# Patient Record
Sex: MALE | Race: WHITE | NOT HISPANIC OR LATINO | URBAN - METROPOLITAN AREA
[De-identification: names, ages, dates, MRNs, and addresses within clinical notes are randomized per-mention and may not be internally consistent; named-entity substitution may affect disease eponyms.]

---

## 2021-12-27 ENCOUNTER — TELEPHONE (OUTPATIENT)
Dept: DERMATOLOGY | Facility: CLINIC | Age: 51
End: 2021-12-27

## 2021-12-28 ENCOUNTER — TELEPHONE (OUTPATIENT)
Dept: DERMATOLOGY | Facility: CLINIC | Age: 51
End: 2021-12-28
Payer: COMMERCIAL

## 2022-02-01 ENCOUNTER — TELEPHONE (OUTPATIENT)
Dept: DERMATOLOGY | Facility: CLINIC | Age: 52
End: 2022-02-01
Payer: COMMERCIAL

## 2022-02-01 NOTE — TELEPHONE ENCOUNTER
----- Message from Stephany Thompson sent at 2/1/2022  2:01 PM CST -----  Regarding: Procedure  Inquiry  Regarding:Pt called to speak to office about procedure he has set for tomorrow. Would like to know if can take a Xanax and does he need a covid test?    Can patient be contacted on [employer]:no    Call back number:[phone]

## 2022-02-01 NOTE — TELEPHONE ENCOUNTER
Spoke with patient and reassured him it is okay to take a Xanax before procedure tomorrow and we do not require a Covid test.

## 2022-02-02 ENCOUNTER — PROCEDURE VISIT (OUTPATIENT)
Dept: DERMATOLOGY | Facility: CLINIC | Age: 52
End: 2022-02-02
Payer: COMMERCIAL

## 2022-02-02 VITALS
DIASTOLIC BLOOD PRESSURE: 90 MMHG | SYSTOLIC BLOOD PRESSURE: 138 MMHG | HEIGHT: 76 IN | BODY MASS INDEX: 26.18 KG/M2 | WEIGHT: 215 LBS | HEART RATE: 59 BPM

## 2022-02-02 DIAGNOSIS — C44.311 BASAL CELL CARCINOMA OF NOSE: Primary | ICD-10-CM

## 2022-02-02 PROCEDURE — 14060 TIS TRNFR E/N/E/L 10 SQ CM/<: CPT | Mod: S$GLB,,, | Performed by: DERMATOLOGY

## 2022-02-02 PROCEDURE — 17311: ICD-10-PCS | Mod: 51,S$GLB,, | Performed by: DERMATOLOGY

## 2022-02-02 PROCEDURE — 17312: ICD-10-PCS | Mod: S$GLB,,, | Performed by: DERMATOLOGY

## 2022-02-02 PROCEDURE — 14060 PR ADJ TISS XFER LID,NOS,EAR <10 SQCM: ICD-10-PCS | Mod: S$GLB,,, | Performed by: DERMATOLOGY

## 2022-02-02 PROCEDURE — 99499 UNLISTED E&M SERVICE: CPT | Mod: S$GLB,,, | Performed by: DERMATOLOGY

## 2022-02-02 PROCEDURE — 17311 MOHS 1 STAGE H/N/HF/G: CPT | Mod: 51,S$GLB,, | Performed by: DERMATOLOGY

## 2022-02-02 PROCEDURE — 17312 MOHS ADDL STAGE: CPT | Mod: S$GLB,,, | Performed by: DERMATOLOGY

## 2022-02-02 PROCEDURE — 99499 NO LOS: ICD-10-PCS | Mod: S$GLB,,, | Performed by: DERMATOLOGY

## 2022-02-02 RX ORDER — CEPHALEXIN 500 MG/1
500 CAPSULE ORAL 3 TIMES DAILY
Qty: 21 CAPSULE | Refills: 0 | Status: SHIPPED | OUTPATIENT
Start: 2022-02-02 | End: 2022-02-09

## 2022-02-02 RX ORDER — HYDROCODONE BITARTRATE AND ACETAMINOPHEN 5; 325 MG/1; MG/1
1 TABLET ORAL EVERY 6 HOURS PRN
Qty: 10 TABLET | Refills: 0 | Status: SHIPPED | OUTPATIENT
Start: 2022-02-02

## 2022-02-02 RX ORDER — LEVOCETIRIZINE DIHYDROCHLORIDE 5 MG/1
5 TABLET, FILM COATED ORAL NIGHTLY
COMMUNITY
Start: 2021-12-13

## 2022-02-02 NOTE — PROGRESS NOTES
Physical Exam   HENT:   Nose:         R nasal tip with a 4 x 4 mm pink pearly papule located 4.5 cm inferiorly from the right medial canthus and 3.5 cm superiorly from the right alar base.    Biopsy proven nodular basal cell carcinoma- Nose, path# [id number].  Diagnosis, photograph, and pathology report were reviewed with patient.  Discussed risks, benefits, and alternatives of Mohs surgery.  Discussed repair options including complex closure, skin flap, skin graft, and second intention healing.  Patient elected to proceed with Mohs surgery today.      PROCEDURE: Mohs' Micrographic Surgery    INDICATION: Location in mask areas of face including central face, nose, eyelids, eyebrows, lips, chin, preauricular, temple, and ear. Biopsy-proven skin cancer of cosmetically and functionally important areas, including head, neck, genital, hand, foot, or areas known for having difficulty in healing, such as the lower anterior legs. Tumor with ill-defined borders.    REFERRING PROVIDER: Leyla Hernandez MD    CASE NUMBER:     ANESTHETIC: 1 cc 1% Lidocaine, plain and 1.5 cc 0.5% Lidocaine with Epi 1:200,000 mixed 1:1 with 0.5% Bupivacaine    SURGICAL PREP: Hibiclens    SURGEON: David Anderson MD    ASSISTANTS: Cortney Coronel PA-C and Oksana Fernandes, Surg Tech    PREOPERATIVE DIAGNOSIS: basal cell carcinoma- nodular    POSTOPERATIVE DIAGNOSIS: basal cell carcinoma    PATHOLOGIC DIAGNOSIS: basal cell carcinoma- nodular    HISTOLOGY OF SPECIMENS IN FIRST STAGE:   Tumor Type: Tumor seen. Nodular basal cell carcinoma: Nodular tumor in dermis composed of basaloid cells exhibiting peripheral palisading and retraction artifact.   Depth of Invasion: subcutaneous tissue  Perineural Invasion: No    HISTOLOGY OF SPECIMENS IN SUBSEQUENT STAGES:  · Tumor Type: No tumor seen.    STAGES OF MOHS' SURGERY PERFORMED: 2    TUMOR-FREE PLANE ACHIEVED: Yes - with deep tissue removal    HEMOSTASIS: electrocoagulation     SPECIMENS: 3 (2 in stage A and 1  in stage B)    LOCATION: nose (R nasal tip). Location verified with Dr. Hernandez's clinical photograph. Patient also verified location with hand held mirror.    INITIAL LESION SIZE: 0.4 x 0.4 cm    FINAL DEFECT SIZE: 0.5 x 0.6 cm    WOUND REPAIR/DISPOSITION: The patient tolerated Mohs' Micrographic Surgery for a basal cell carcinoma very well. When the tumor was completely removed, a repair of the surgical defect was undertaken.       PROCEDURE: Burow's Advancement Flap (Adjacent Tissue Transfer)    INDICATION: Status post Mohs' Micrographic Surgery for basal cell carcinoma.    CASE NUMBER:     ANESTHETIC: 1 cc 1% Lidocaine with Epinephrine 1:100,000    SURGICAL PREP: Hibiclens, prepped by Valerie Arce MA    SURGEON: David Anderson MD    ASSISTANTS: Cortney Coronel PA-C, Valerie Arce MA and Júnior Mahan MD    LOCATION: nose (R nasal tip)    DEFECT SIZE: 0.5 x 0.6 cm    WOUND REPAIR/DISPOSITION:  After the patient's carcinoma had been completely removed with Mohs' Micrographic Surgery, a repair of the surgical defect was undertaken. The patient was returned to the operating suite where the area of right nasal tip was prepped, draped, and anesthetized in the usual sterile fashion. A Burow's advancement flap was designed in the inferior surrounding tissue of the nasal tip. A Burow's triangle was drawn in superiorly to help with tissue movement. Then with a #15 blade the flap was incised and the Burow's triangle was excised, the area was widely undermined in the submuscular tissue plane. Then, electrocoagulation was used to obtain meticulous hemostasis. A 5-0 Vicryl pexing suture was performed by attaching the underside of the most medial aspect of the flap to the nasal tip. The flap was then advanced in by a complex closure. Then, 5-0 Vicryl buried vertical mattress sutures were placed into the subcutaneous and dermal plane to close the wound and zainab the cutaneous wound edge. The flap was then trimmed to fit the  "defect. The cutaneous wound edges were closed using interrupted 5-0 Prolene sutures.    The patient tolerated the procedure well.    The area was cleaned and dressed appropriately and the patient was given wound care instructions, as well as an appointment for follow-up evaluation and suture removal in 7 days. Patient was placed on Norco 5-325 mg prn postop pain and Keflex 500 mg TID x 7 days.    SIZE OF FLAP: 4 cm squared    Vitals:    02/02/22 0749 02/02/22 1126   BP: (!) 142/90 (!) 138/90   BP Location: Right arm    Patient Position: Sitting    BP Method: Small (Automatic)    Pulse: 71 (!) 59   Weight: 97.5 kg (215 lb)    Height: 6' 4" (1.93 m)        "

## 2022-02-09 ENCOUNTER — OFFICE VISIT (OUTPATIENT)
Dept: DERMATOLOGY | Facility: CLINIC | Age: 52
End: 2022-02-09
Payer: COMMERCIAL

## 2022-02-09 DIAGNOSIS — Z09 POSTOP CHECK: Primary | ICD-10-CM

## 2022-02-09 PROCEDURE — 99024 PR POST-OP FOLLOW-UP VISIT: ICD-10-PCS | Mod: S$GLB,,, | Performed by: DERMATOLOGY

## 2022-02-09 PROCEDURE — 99024 POSTOP FOLLOW-UP VISIT: CPT | Mod: S$GLB,,, | Performed by: DERMATOLOGY

## 2022-02-09 PROCEDURE — 99999 PR PBB SHADOW E&M-EST. PATIENT-LVL II: ICD-10-PCS | Mod: PBBFAC,,, | Performed by: DERMATOLOGY

## 2022-02-09 PROCEDURE — 1159F PR MEDICATION LIST DOCUMENTED IN MEDICAL RECORD: ICD-10-PCS | Mod: CPTII,S$GLB,, | Performed by: DERMATOLOGY

## 2022-02-09 PROCEDURE — 1160F RVW MEDS BY RX/DR IN RCRD: CPT | Mod: CPTII,S$GLB,, | Performed by: DERMATOLOGY

## 2022-02-09 PROCEDURE — 1159F MED LIST DOCD IN RCRD: CPT | Mod: CPTII,S$GLB,, | Performed by: DERMATOLOGY

## 2022-02-09 PROCEDURE — 99999 PR PBB SHADOW E&M-EST. PATIENT-LVL II: CPT | Mod: PBBFAC,,, | Performed by: DERMATOLOGY

## 2022-02-09 PROCEDURE — 1160F PR REVIEW ALL MEDS BY PRESCRIBER/CLIN PHARMACIST DOCUMENTED: ICD-10-PCS | Mod: CPTII,S$GLB,, | Performed by: DERMATOLOGY

## 2022-02-09 NOTE — PROGRESS NOTES
51 y.o. male patient is here for suture removal following Mohs' surgery.    Patient reports no problems.    WOUND PE:  The nose sutures intact. Wound healing well. Good skin edges. No signs or symptoms of infection. Flap is pink and viable.    IMPRESSION:  Healing operative site from Mohs' surgery, BCC nose s/p Mohs with Burow's Advancement Flap, postop day #7.    PLAN:  Sutures removed today by Valerie Arce MA. Steri-strips applied.  Continue wound care.  Keep moist with Aquaphor.    RTC:  In 1 month.

## 2022-02-14 ENCOUNTER — PATIENT MESSAGE (OUTPATIENT)
Dept: DERMATOLOGY | Facility: CLINIC | Age: 52
End: 2022-02-14
Payer: COMMERCIAL

## 2022-02-15 ENCOUNTER — TELEPHONE (OUTPATIENT)
Dept: DERMATOLOGY | Facility: CLINIC | Age: 52
End: 2022-02-15
Payer: COMMERCIAL

## 2022-02-15 ENCOUNTER — PATIENT MESSAGE (OUTPATIENT)
Dept: DERMATOLOGY | Facility: CLINIC | Age: 52
End: 2022-02-15
Payer: COMMERCIAL

## 2022-02-15 NOTE — TELEPHONE ENCOUNTER
Pt had his sutures removed on 2/9/22 on his nose s/p Mohs with Burow's Advancement Flap. Pt accidentally hit his nose while putting on a shirt. Sent a picture through the portal and it appears that it may have opened up. Pt is scheduled to come in tomorrow afternoon at 210 pm. Pt confirmed date and time.

## 2022-02-15 NOTE — TELEPHONE ENCOUNTER
----- Message from Crista Lindsay sent at 2/15/2022  3:32 PM CST -----  Type: Patient Call Back         Who called: Patient          What is the request in detail: patient called and states that if its  ok for him to come in on today  to get that wound check out . And he also wants to know what time does the department close on today ?         Can the clinic reply by [facility]?no          Would the patient rather a call back or a response via [facility]?call back          Best call back number:[phone] (mobile)          Additional Information:           Thank You

## 2022-02-16 ENCOUNTER — OFFICE VISIT (OUTPATIENT)
Dept: DERMATOLOGY | Facility: CLINIC | Age: 52
End: 2022-02-16
Payer: COMMERCIAL

## 2022-02-16 DIAGNOSIS — C44.311 BASAL CELL CARCINOMA OF NOSE: Primary | ICD-10-CM

## 2022-02-16 PROCEDURE — 1160F PR REVIEW ALL MEDS BY PRESCRIBER/CLIN PHARMACIST DOCUMENTED: ICD-10-PCS | Mod: CPTII,S$GLB,, | Performed by: DERMATOLOGY

## 2022-02-16 PROCEDURE — 87077 CULTURE AEROBIC IDENTIFY: CPT | Performed by: DERMATOLOGY

## 2022-02-16 PROCEDURE — 1159F PR MEDICATION LIST DOCUMENTED IN MEDICAL RECORD: ICD-10-PCS | Mod: CPTII,S$GLB,, | Performed by: DERMATOLOGY

## 2022-02-16 PROCEDURE — 99024 POSTOP FOLLOW-UP VISIT: CPT | Mod: S$GLB,,, | Performed by: DERMATOLOGY

## 2022-02-16 PROCEDURE — 1159F MED LIST DOCD IN RCRD: CPT | Mod: CPTII,S$GLB,, | Performed by: DERMATOLOGY

## 2022-02-16 PROCEDURE — 1160F RVW MEDS BY RX/DR IN RCRD: CPT | Mod: CPTII,S$GLB,, | Performed by: DERMATOLOGY

## 2022-02-16 PROCEDURE — 99999 PR PBB SHADOW E&M-EST. PATIENT-LVL I: CPT | Mod: PBBFAC,,, | Performed by: DERMATOLOGY

## 2022-02-16 PROCEDURE — 87070 CULTURE OTHR SPECIMN AEROBIC: CPT | Performed by: DERMATOLOGY

## 2022-02-16 PROCEDURE — 99999 PR PBB SHADOW E&M-EST. PATIENT-LVL I: ICD-10-PCS | Mod: PBBFAC,,, | Performed by: DERMATOLOGY

## 2022-02-16 PROCEDURE — 99024 PR POST-OP FOLLOW-UP VISIT: ICD-10-PCS | Mod: S$GLB,,, | Performed by: DERMATOLOGY

## 2022-02-16 PROCEDURE — 87186 SC STD MICRODIL/AGAR DIL: CPT | Performed by: DERMATOLOGY

## 2022-02-16 NOTE — PROGRESS NOTES
51 y.o. male patient is here for wound check after surgery.    Patient reports concern about wound healing. States he bumped his nose in middle of night, woke him up in pain right after it happened.     WOUND PE:  The nasal tip flap with central dehiscence and superficial crusting. No obvious signs of infection    IMPRESSION:  Healing operative site from Mohs' surgery BCC nose s/p Mohs with Burow's advancement flap, postop day # 14, now with mild central dehsicence    PLAN:  Bacterial culture today to rule out any underlying infection  Hold off on abx pending culture results  For now, keep moist and covered daily  No peroxide, soap and water only  Continue wound care.   Wound rebandaged today  Reassured pt it will heal but take a little longer now  Send in photos as needed    RTC:  In 1 month.

## 2022-02-20 LAB — BACTERIA SPEC AEROBE CULT: ABNORMAL

## 2022-03-08 ENCOUNTER — PATIENT MESSAGE (OUTPATIENT)
Dept: DERMATOLOGY | Facility: CLINIC | Age: 52
End: 2022-03-08
Payer: COMMERCIAL

## 2022-03-16 ENCOUNTER — OFFICE VISIT (OUTPATIENT)
Dept: DERMATOLOGY | Facility: CLINIC | Age: 52
End: 2022-03-16
Payer: COMMERCIAL

## 2022-03-16 DIAGNOSIS — Z09 POSTOP CHECK: Primary | ICD-10-CM

## 2022-03-16 PROCEDURE — 1159F MED LIST DOCD IN RCRD: CPT | Mod: CPTII,S$GLB,, | Performed by: DERMATOLOGY

## 2022-03-16 PROCEDURE — 1159F PR MEDICATION LIST DOCUMENTED IN MEDICAL RECORD: ICD-10-PCS | Mod: CPTII,S$GLB,, | Performed by: DERMATOLOGY

## 2022-03-16 PROCEDURE — 1160F RVW MEDS BY RX/DR IN RCRD: CPT | Mod: CPTII,S$GLB,, | Performed by: DERMATOLOGY

## 2022-03-16 PROCEDURE — 99999 PR PBB SHADOW E&M-EST. PATIENT-LVL II: CPT | Mod: PBBFAC,,, | Performed by: DERMATOLOGY

## 2022-03-16 PROCEDURE — 99024 POSTOP FOLLOW-UP VISIT: CPT | Mod: S$GLB,,, | Performed by: DERMATOLOGY

## 2022-03-16 PROCEDURE — 99024 PR POST-OP FOLLOW-UP VISIT: ICD-10-PCS | Mod: S$GLB,,, | Performed by: DERMATOLOGY

## 2022-03-16 PROCEDURE — 1160F PR REVIEW ALL MEDS BY PRESCRIBER/CLIN PHARMACIST DOCUMENTED: ICD-10-PCS | Mod: CPTII,S$GLB,, | Performed by: DERMATOLOGY

## 2022-03-16 PROCEDURE — 99999 PR PBB SHADOW E&M-EST. PATIENT-LVL II: ICD-10-PCS | Mod: PBBFAC,,, | Performed by: DERMATOLOGY

## 2022-03-16 NOTE — PROGRESS NOTES
51 y.o. male patient is here for wound check after surgery.    Patient reports no problems.    WOUND PE:  The nose flap is well healed. Mild firmness and erythema of scar. No nodularity.    IMPRESSION:  Healing operative site from Mohs' surgery, BCC nose s/p Mohs with Burow's Advancement Flap, postop week #6, well healed and no evidence of recurrence.     PLAN:    Recommended massage daily x 10-15 min per day.   Reassured patient that redness and firmness will fade with time.  Daily SPF.  Regular skin checks.  Call if anything desired in future    RTC:  In 3-6 months with Leyla Hernandez MD for skin check or sooner if new concern arises.